# Patient Record
Sex: MALE | URBAN - METROPOLITAN AREA
[De-identification: names, ages, dates, MRNs, and addresses within clinical notes are randomized per-mention and may not be internally consistent; named-entity substitution may affect disease eponyms.]

---

## 2018-10-17 ENCOUNTER — APPOINTMENT (RX ONLY)
Dept: URBAN - METROPOLITAN AREA CLINIC 91 | Facility: CLINIC | Age: 53
Setting detail: DERMATOLOGY
End: 2018-10-17

## 2018-10-17 DIAGNOSIS — B07.8 OTHER VIRAL WARTS: ICD-10-CM

## 2018-10-17 PROCEDURE — 99213 OFFICE O/P EST LOW 20 MIN: CPT

## 2018-10-17 PROCEDURE — ? COUNSELING

## 2018-10-17 NOTE — HPI: WARTS (VERRUCA)
How Severe Are Your Warts?: moderate
Is This A New Presentation, Or A Follow-Up?: Warts
Additional History: No family hx of skin disease.

## 2018-10-17 NOTE — PROCEDURE: COUNSELING
Detail Level: Detailed
Patient Specific Counseling (Will Not Stick From Patient To Patient): Froze 11 warts on R arm & hand